# Patient Record
Sex: MALE | Race: WHITE | NOT HISPANIC OR LATINO | Employment: FULL TIME | URBAN - NONMETROPOLITAN AREA
[De-identification: names, ages, dates, MRNs, and addresses within clinical notes are randomized per-mention and may not be internally consistent; named-entity substitution may affect disease eponyms.]

---

## 2022-06-28 RX ORDER — CARVEDILOL 25 MG/1
TABLET ORAL
Qty: 180 TABLET | OUTPATIENT
Start: 2022-06-28

## 2022-06-28 RX ORDER — VALSARTAN 160 MG/1
TABLET ORAL
Qty: 90 TABLET | OUTPATIENT
Start: 2022-06-28

## 2022-07-25 RX ORDER — VALSARTAN 160 MG/1
TABLET ORAL
Qty: 30 TABLET | Refills: 0 | Status: SHIPPED | OUTPATIENT
Start: 2022-07-25 | End: 2022-09-02 | Stop reason: SDUPTHER

## 2022-09-02 ENCOUNTER — OFFICE VISIT (OUTPATIENT)
Dept: FAMILY MEDICINE CLINIC | Facility: CLINIC | Age: 67
End: 2022-09-02

## 2022-09-02 VITALS
HEIGHT: 68 IN | SYSTOLIC BLOOD PRESSURE: 118 MMHG | OXYGEN SATURATION: 97 % | WEIGHT: 184.8 LBS | DIASTOLIC BLOOD PRESSURE: 70 MMHG | BODY MASS INDEX: 28.01 KG/M2 | HEART RATE: 74 BPM

## 2022-09-02 DIAGNOSIS — I10 ESSENTIAL HYPERTENSION: Primary | ICD-10-CM

## 2022-09-02 PROBLEM — E29.1 MALE HYPOGONADISM: Status: ACTIVE | Noted: 2020-02-28

## 2022-09-02 PROBLEM — K21.9 GASTROESOPHAGEAL REFLUX DISEASE WITHOUT ESOPHAGITIS: Status: ACTIVE | Noted: 2020-02-14

## 2022-09-02 PROBLEM — N40.1 BENIGN PROSTATIC HYPERPLASIA WITH URINARY OBSTRUCTION: Status: ACTIVE | Noted: 2020-02-14

## 2022-09-02 PROBLEM — N40.1 LOWER URINARY TRACT SYMPTOMS DUE TO BENIGN PROSTATIC HYPERPLASIA: Status: ACTIVE | Noted: 2022-03-17

## 2022-09-02 PROBLEM — N13.8 BENIGN PROSTATIC HYPERPLASIA WITH URINARY OBSTRUCTION: Status: ACTIVE | Noted: 2020-02-14

## 2022-09-02 PROCEDURE — 99213 OFFICE O/P EST LOW 20 MIN: CPT | Performed by: PHYSICIAN ASSISTANT

## 2022-09-02 RX ORDER — CARVEDILOL 25 MG/1
TABLET ORAL EVERY 12 HOURS SCHEDULED
COMMUNITY
End: 2022-09-02 | Stop reason: SDUPTHER

## 2022-09-02 RX ORDER — CARVEDILOL 25 MG/1
25 TABLET ORAL EVERY 12 HOURS SCHEDULED
Qty: 180 TABLET | Refills: 1 | Status: SHIPPED | OUTPATIENT
Start: 2022-09-02 | End: 2023-02-23

## 2022-09-02 RX ORDER — VALSARTAN 160 MG/1
160 TABLET ORAL DAILY
Qty: 90 TABLET | Refills: 1 | Status: SHIPPED | OUTPATIENT
Start: 2022-09-02 | End: 2023-02-23

## 2022-09-08 PROCEDURE — 36415 COLL VENOUS BLD VENIPUNCTURE: CPT | Performed by: PHYSICIAN ASSISTANT

## 2022-09-09 LAB
ALBUMIN SERPL-MCNC: 4.9 G/DL (ref 3.8–4.8)
ALBUMIN/GLOB SERPL: 2.1 {RATIO} (ref 1.2–2.2)
ALP SERPL-CCNC: 56 IU/L (ref 44–121)
ALT SERPL-CCNC: 24 IU/L (ref 0–44)
AST SERPL-CCNC: 26 IU/L (ref 0–40)
BILIRUB SERPL-MCNC: 0.3 MG/DL (ref 0–1.2)
BUN SERPL-MCNC: 18 MG/DL (ref 8–27)
BUN/CREAT SERPL: 17 (ref 10–24)
CALCIUM SERPL-MCNC: 9.8 MG/DL (ref 8.6–10.2)
CHLORIDE SERPL-SCNC: 102 MMOL/L (ref 96–106)
CO2 SERPL-SCNC: 21 MMOL/L (ref 20–29)
CREAT SERPL-MCNC: 1.04 MG/DL (ref 0.76–1.27)
EGFRCR-CYS SERPLBLD CKD-EPI 2021: 78 ML/MIN/1.73
GLOBULIN SER CALC-MCNC: 2.3 G/DL (ref 1.5–4.5)
GLUCOSE SERPL-MCNC: 95 MG/DL (ref 65–99)
POTASSIUM SERPL-SCNC: 4.9 MMOL/L (ref 3.5–5.2)
PROT SERPL-MCNC: 7.2 G/DL (ref 6–8.5)
SODIUM SERPL-SCNC: 138 MMOL/L (ref 134–144)

## 2023-02-13 ENCOUNTER — TELEPHONE (OUTPATIENT)
Dept: FAMILY MEDICINE CLINIC | Facility: CLINIC | Age: 68
End: 2023-02-13

## 2023-02-23 RX ORDER — VALSARTAN 160 MG/1
160 TABLET ORAL DAILY
Qty: 90 TABLET | Refills: 1 | Status: SHIPPED | OUTPATIENT
Start: 2023-02-23 | End: 2023-02-28 | Stop reason: SDUPTHER

## 2023-02-23 RX ORDER — CARVEDILOL 25 MG/1
TABLET ORAL
Qty: 180 TABLET | Refills: 1 | Status: SHIPPED | OUTPATIENT
Start: 2023-02-23 | End: 2023-02-28 | Stop reason: SDUPTHER

## 2023-02-28 RX ORDER — VALSARTAN 160 MG/1
160 TABLET ORAL DAILY
Qty: 90 TABLET | Refills: 1 | Status: SHIPPED | OUTPATIENT
Start: 2023-02-28 | End: 2023-03-24 | Stop reason: SDUPTHER

## 2023-02-28 RX ORDER — CARVEDILOL 25 MG/1
25 TABLET ORAL EVERY 12 HOURS
Qty: 180 TABLET | Refills: 1 | Status: SHIPPED | OUTPATIENT
Start: 2023-02-28 | End: 2023-03-24 | Stop reason: SDUPTHER

## 2023-03-24 ENCOUNTER — OFFICE VISIT (OUTPATIENT)
Dept: FAMILY MEDICINE CLINIC | Facility: CLINIC | Age: 68
End: 2023-03-24
Payer: MEDICARE

## 2023-03-24 VITALS
BODY MASS INDEX: 28.79 KG/M2 | SYSTOLIC BLOOD PRESSURE: 132 MMHG | WEIGHT: 190 LBS | DIASTOLIC BLOOD PRESSURE: 90 MMHG | HEIGHT: 68 IN

## 2023-03-24 DIAGNOSIS — R05.3 CHRONIC COUGH: ICD-10-CM

## 2023-03-24 DIAGNOSIS — R05.3 CHRONIC COUGH: Primary | ICD-10-CM

## 2023-03-24 DIAGNOSIS — I10 ESSENTIAL HYPERTENSION: ICD-10-CM

## 2023-03-24 DIAGNOSIS — K21.9 GERD WITHOUT ESOPHAGITIS: ICD-10-CM

## 2023-03-24 DIAGNOSIS — R93.89 ABNORMAL FINDING ON CHEST XRAY: Primary | ICD-10-CM

## 2023-03-24 PROBLEM — E29.1 MALE HYPOGONADISM: Status: RESOLVED | Noted: 2020-02-28 | Resolved: 2023-03-24

## 2023-03-24 PROBLEM — N40.1 LOWER URINARY TRACT SYMPTOMS DUE TO BENIGN PROSTATIC HYPERPLASIA: Status: RESOLVED | Noted: 2022-03-17 | Resolved: 2023-03-24

## 2023-03-24 RX ORDER — VALSARTAN 160 MG/1
160 TABLET ORAL DAILY
Qty: 90 TABLET | Refills: 1 | Status: SHIPPED | OUTPATIENT
Start: 2023-03-24

## 2023-03-24 RX ORDER — CARVEDILOL 25 MG/1
25 TABLET ORAL EVERY 12 HOURS
Qty: 180 TABLET | Refills: 1 | Status: SHIPPED | OUTPATIENT
Start: 2023-03-24

## 2023-03-24 RX ORDER — PANTOPRAZOLE SODIUM 40 MG/1
40 TABLET, DELAYED RELEASE ORAL DAILY
Qty: 30 TABLET | Refills: 1 | Status: SHIPPED | OUTPATIENT
Start: 2023-03-24 | End: 2023-03-27

## 2023-03-27 RX ORDER — PANTOPRAZOLE SODIUM 40 MG/1
40 TABLET, DELAYED RELEASE ORAL DAILY
Qty: 90 TABLET | Refills: 0 | Status: SHIPPED | OUTPATIENT
Start: 2023-03-27

## 2023-03-28 ENCOUNTER — TELEPHONE (OUTPATIENT)
Dept: FAMILY MEDICINE CLINIC | Facility: CLINIC | Age: 68
End: 2023-03-28
Payer: MEDICARE

## 2023-03-29 ENCOUNTER — TELEPHONE (OUTPATIENT)
Dept: FAMILY MEDICINE CLINIC | Facility: CLINIC | Age: 68
End: 2023-03-29
Payer: MEDICARE

## 2023-04-07 ENCOUNTER — TELEPHONE (OUTPATIENT)
Dept: FAMILY MEDICINE CLINIC | Facility: CLINIC | Age: 68
End: 2023-04-07
Payer: MEDICARE

## 2023-04-20 ENCOUNTER — TELEPHONE (OUTPATIENT)
Dept: FAMILY MEDICINE CLINIC | Facility: CLINIC | Age: 68
End: 2023-04-20
Payer: MEDICARE

## 2023-04-20 DIAGNOSIS — R05.3 CHRONIC COUGH: ICD-10-CM

## 2023-04-20 DIAGNOSIS — K21.9 GERD WITHOUT ESOPHAGITIS: ICD-10-CM

## 2023-04-20 RX ORDER — PANTOPRAZOLE SODIUM 40 MG/1
40 TABLET, DELAYED RELEASE ORAL DAILY
Qty: 90 TABLET | Refills: 0 | Status: SHIPPED | OUTPATIENT
Start: 2023-04-20

## 2023-05-02 ENCOUNTER — OFFICE VISIT (OUTPATIENT)
Dept: FAMILY MEDICINE CLINIC | Facility: CLINIC | Age: 68
End: 2023-05-02
Payer: MEDICARE

## 2023-05-02 VITALS
DIASTOLIC BLOOD PRESSURE: 78 MMHG | HEIGHT: 68 IN | BODY MASS INDEX: 28.2 KG/M2 | SYSTOLIC BLOOD PRESSURE: 130 MMHG | WEIGHT: 186.1 LBS | OXYGEN SATURATION: 97 % | HEART RATE: 61 BPM

## 2023-05-02 DIAGNOSIS — Z12.11 SCREENING FOR COLON CANCER: ICD-10-CM

## 2023-05-02 DIAGNOSIS — J84.9 CHRONIC INTERSTITIAL LUNG DISEASE: ICD-10-CM

## 2023-05-02 DIAGNOSIS — R10.2 PELVIC PAIN IN MALE: ICD-10-CM

## 2023-05-02 DIAGNOSIS — Z00.00 GENERAL MEDICAL EXAM: Primary | ICD-10-CM

## 2023-05-02 DIAGNOSIS — Z23 NEED FOR PROPHYLACTIC VACCINATION AGAINST STREPTOCOCCUS PNEUMONIAE (PNEUMOCOCCUS): ICD-10-CM

## 2023-05-02 DIAGNOSIS — I10 ESSENTIAL HYPERTENSION: Chronic | ICD-10-CM

## 2023-05-02 DIAGNOSIS — Z12.5 PROSTATE CANCER SCREENING: ICD-10-CM

## 2023-05-02 DIAGNOSIS — N28.1 RENAL CYST: ICD-10-CM

## 2023-05-02 DIAGNOSIS — J43.9 PULMONARY EMPHYSEMA, UNSPECIFIED EMPHYSEMA TYPE: ICD-10-CM

## 2023-05-02 DIAGNOSIS — Z11.59 NEED FOR HEPATITIS C SCREENING TEST: ICD-10-CM

## 2023-05-02 DIAGNOSIS — R05.3 CHRONIC COUGH: ICD-10-CM

## 2023-05-02 PROBLEM — K21.9 GASTROESOPHAGEAL REFLUX DISEASE WITHOUT ESOPHAGITIS: Status: RESOLVED | Noted: 2020-02-14 | Resolved: 2023-05-02

## 2023-05-02 PROBLEM — K21.9 GERD WITHOUT ESOPHAGITIS: Status: ACTIVE | Noted: 2023-05-02

## 2023-05-02 PROBLEM — N13.8 BENIGN PROSTATIC HYPERPLASIA WITH URINARY OBSTRUCTION: Status: RESOLVED | Noted: 2020-02-14 | Resolved: 2023-05-02

## 2023-05-02 PROBLEM — K21.9 GERD WITHOUT ESOPHAGITIS: Status: RESOLVED | Noted: 2023-05-02 | Resolved: 2023-05-02

## 2023-05-02 PROBLEM — N40.1 BENIGN PROSTATIC HYPERPLASIA WITH URINARY OBSTRUCTION: Status: RESOLVED | Noted: 2020-02-14 | Resolved: 2023-05-02

## 2023-05-02 RX ORDER — CARVEDILOL 25 MG/1
25 TABLET ORAL EVERY 12 HOURS
Qty: 180 TABLET | Refills: 1 | Status: SHIPPED | OUTPATIENT
Start: 2023-05-02

## 2023-05-02 RX ORDER — VALSARTAN 160 MG/1
160 TABLET ORAL DAILY
Qty: 90 TABLET | Refills: 1 | Status: SHIPPED | OUTPATIENT
Start: 2023-05-02

## 2023-05-06 LAB
ALBUMIN SERPL-MCNC: 4.6 G/DL (ref 3.8–4.8)
ALBUMIN/GLOB SERPL: 1.7 {RATIO} (ref 1.2–2.2)
ALP SERPL-CCNC: 66 IU/L (ref 44–121)
ALT SERPL-CCNC: 21 IU/L (ref 0–44)
AST SERPL-CCNC: 24 IU/L (ref 0–40)
BASOPHILS # BLD AUTO: 0.1 X10E3/UL (ref 0–0.2)
BASOPHILS NFR BLD AUTO: 1 %
BILIRUB SERPL-MCNC: 0.3 MG/DL (ref 0–1.2)
BUN SERPL-MCNC: 17 MG/DL (ref 8–27)
BUN/CREAT SERPL: 15 (ref 10–24)
CALCIUM SERPL-MCNC: 9.2 MG/DL (ref 8.6–10.2)
CHLORIDE SERPL-SCNC: 102 MMOL/L (ref 96–106)
CHOLEST SERPL-MCNC: 188 MG/DL (ref 100–199)
CO2 SERPL-SCNC: 19 MMOL/L (ref 20–29)
CREAT SERPL-MCNC: 1.13 MG/DL (ref 0.76–1.27)
EGFRCR SERPLBLD CKD-EPI 2021: 70 ML/MIN/1.73
EOSINOPHIL # BLD AUTO: 0.6 X10E3/UL (ref 0–0.4)
EOSINOPHIL NFR BLD AUTO: 8 %
ERYTHROCYTE [DISTWIDTH] IN BLOOD BY AUTOMATED COUNT: 13 % (ref 11.6–15.4)
GLOBULIN SER CALC-MCNC: 2.7 G/DL (ref 1.5–4.5)
GLUCOSE SERPL-MCNC: 96 MG/DL (ref 70–99)
HCT VFR BLD AUTO: 43.7 % (ref 37.5–51)
HCV AB SERPL QL IA: NORMAL
HCV IGG SERPL QL IA: NON REACTIVE
HDLC SERPL-MCNC: 33 MG/DL
HGB BLD-MCNC: 14.6 G/DL (ref 13–17.7)
IMM GRANULOCYTES # BLD AUTO: 0 X10E3/UL (ref 0–0.1)
IMM GRANULOCYTES NFR BLD AUTO: 0 %
LDLC SERPL CALC-MCNC: 127 MG/DL (ref 0–99)
LYMPHOCYTES # BLD AUTO: 2 X10E3/UL (ref 0.7–3.1)
LYMPHOCYTES NFR BLD AUTO: 25 %
MCH RBC QN AUTO: 28.2 PG (ref 26.6–33)
MCHC RBC AUTO-ENTMCNC: 33.4 G/DL (ref 31.5–35.7)
MCV RBC AUTO: 85 FL (ref 79–97)
MONOCYTES # BLD AUTO: 0.5 X10E3/UL (ref 0.1–0.9)
MONOCYTES NFR BLD AUTO: 7 %
NEUTROPHILS # BLD AUTO: 4.7 X10E3/UL (ref 1.4–7)
NEUTROPHILS NFR BLD AUTO: 59 %
PLATELET # BLD AUTO: 287 X10E3/UL (ref 150–450)
POTASSIUM SERPL-SCNC: 4.4 MMOL/L (ref 3.5–5.2)
PROT SERPL-MCNC: 7.3 G/DL (ref 6–8.5)
PSA SERPL-MCNC: 1.2 NG/ML (ref 0–4)
RBC # BLD AUTO: 5.17 X10E6/UL (ref 4.14–5.8)
SODIUM SERPL-SCNC: 140 MMOL/L (ref 134–144)
T4 FREE SERPL-MCNC: 1.09 NG/DL (ref 0.82–1.77)
TRIGL SERPL-MCNC: 157 MG/DL (ref 0–149)
TSH SERPL DL<=0.005 MIU/L-ACNC: 2.86 UIU/ML (ref 0.45–4.5)
VLDLC SERPL CALC-MCNC: 28 MG/DL (ref 5–40)
WBC # BLD AUTO: 7.9 X10E3/UL (ref 3.4–10.8)

## 2023-05-12 ENCOUNTER — TELEPHONE (OUTPATIENT)
Dept: FAMILY MEDICINE CLINIC | Facility: CLINIC | Age: 68
End: 2023-05-12

## 2023-08-01 ENCOUNTER — HOSPITAL ENCOUNTER (OUTPATIENT)
Dept: CT IMAGING | Facility: HOSPITAL | Age: 68
Discharge: HOME | End: 2023-08-01
Admitting: INTERNAL MEDICINE
Payer: MEDICARE

## 2023-08-01 DIAGNOSIS — J84.9 CHRONIC INTERSTITIAL LUNG DISEASE: ICD-10-CM

## 2023-08-01 PROCEDURE — 71250 CT THORAX DX C-: CPT

## 2023-08-23 ENCOUNTER — OFFICE VISIT (OUTPATIENT)
Dept: PULMONOLOGY | Facility: CLINIC | Age: 68
End: 2023-08-23
Payer: MEDICARE

## 2023-08-23 VITALS
DIASTOLIC BLOOD PRESSURE: 84 MMHG | HEIGHT: 68 IN | TEMPERATURE: 97.7 F | WEIGHT: 175.7 LBS | SYSTOLIC BLOOD PRESSURE: 118 MMHG | OXYGEN SATURATION: 98 % | HEART RATE: 53 BPM | BODY MASS INDEX: 26.63 KG/M2

## 2023-08-23 DIAGNOSIS — J84.9 CHRONIC INTERSTITIAL LUNG DISEASE: Primary | ICD-10-CM

## 2023-08-23 DIAGNOSIS — R05.3 CHRONIC COUGH: ICD-10-CM

## 2023-08-23 PROCEDURE — 99214 OFFICE O/P EST MOD 30 MIN: CPT | Performed by: NURSE PRACTITIONER

## 2023-08-23 PROCEDURE — 1159F MED LIST DOCD IN RCRD: CPT | Performed by: NURSE PRACTITIONER

## 2023-08-23 PROCEDURE — 1160F RVW MEDS BY RX/DR IN RCRD: CPT | Performed by: NURSE PRACTITIONER

## 2023-08-23 PROCEDURE — 3079F DIAST BP 80-89 MM HG: CPT | Performed by: NURSE PRACTITIONER

## 2023-08-23 PROCEDURE — 3074F SYST BP LT 130 MM HG: CPT | Performed by: NURSE PRACTITIONER

## 2023-08-24 DIAGNOSIS — I10 ESSENTIAL HYPERTENSION: Chronic | ICD-10-CM

## 2023-08-24 RX ORDER — CARVEDILOL 25 MG/1
25 TABLET ORAL EVERY 12 HOURS
Qty: 180 TABLET | Refills: 1 | Status: SHIPPED | OUTPATIENT
Start: 2023-08-24

## 2023-08-24 RX ORDER — VALSARTAN 160 MG/1
160 TABLET ORAL DAILY
Qty: 90 TABLET | Refills: 1 | Status: SHIPPED | OUTPATIENT
Start: 2023-08-24

## 2023-08-28 ENCOUNTER — TELEPHONE (OUTPATIENT)
Dept: PULMONOLOGY | Facility: CLINIC | Age: 68
End: 2023-08-28
Payer: MEDICARE

## 2023-08-28 DIAGNOSIS — J84.9 CHRONIC INTERSTITIAL LUNG DISEASE: Primary | ICD-10-CM

## 2023-09-29 ENCOUNTER — TELEPHONE (OUTPATIENT)
Dept: PULMONOLOGY | Facility: CLINIC | Age: 68
End: 2023-09-29
Payer: MEDICARE

## 2023-10-10 ENCOUNTER — TELEPHONE (OUTPATIENT)
Dept: PULMONOLOGY | Facility: CLINIC | Age: 68
End: 2023-10-10
Payer: MEDICARE

## 2023-10-17 ENCOUNTER — OFFICE VISIT (OUTPATIENT)
Dept: FAMILY MEDICINE CLINIC | Facility: CLINIC | Age: 68
End: 2023-10-17
Payer: MEDICARE

## 2023-10-17 VITALS
TEMPERATURE: 97.1 F | DIASTOLIC BLOOD PRESSURE: 80 MMHG | HEART RATE: 76 BPM | SYSTOLIC BLOOD PRESSURE: 136 MMHG | BODY MASS INDEX: 28.34 KG/M2 | OXYGEN SATURATION: 95 % | WEIGHT: 187 LBS | HEIGHT: 68 IN

## 2023-10-17 DIAGNOSIS — R05.3 CHRONIC COUGH: ICD-10-CM

## 2023-10-17 DIAGNOSIS — J84.9 CHRONIC INTERSTITIAL LUNG DISEASE: ICD-10-CM

## 2023-10-17 DIAGNOSIS — E66.3 OVERWEIGHT (BMI 25.0-29.9): ICD-10-CM

## 2023-10-17 DIAGNOSIS — M25.511 CHRONIC RIGHT SHOULDER PAIN: ICD-10-CM

## 2023-10-17 DIAGNOSIS — G89.29 CHRONIC RIGHT SHOULDER PAIN: ICD-10-CM

## 2023-10-17 DIAGNOSIS — I10 ESSENTIAL HYPERTENSION: Primary | Chronic | ICD-10-CM

## 2023-10-17 DIAGNOSIS — N28.1 RENAL CYST: ICD-10-CM

## 2023-10-17 PROBLEM — Z23 NEED FOR PROPHYLACTIC VACCINATION AGAINST STREPTOCOCCUS PNEUMONIAE (PNEUMOCOCCUS): Status: RESOLVED | Noted: 2023-05-02 | Resolved: 2023-10-17

## 2023-10-17 PROBLEM — R10.2 PELVIC PAIN IN MALE: Status: RESOLVED | Noted: 2023-05-02 | Resolved: 2023-10-17

## 2023-10-17 PROBLEM — Z11.59 NEED FOR HEPATITIS C SCREENING TEST: Status: RESOLVED | Noted: 2023-05-02 | Resolved: 2023-10-17

## 2023-10-17 RX ORDER — VALSARTAN 160 MG/1
160 TABLET ORAL DAILY
Qty: 90 TABLET | Refills: 1 | Status: SHIPPED | OUTPATIENT
Start: 2023-10-17

## 2023-10-17 RX ORDER — CARVEDILOL 25 MG/1
25 TABLET ORAL EVERY 12 HOURS
Qty: 180 TABLET | Refills: 1 | Status: SHIPPED | OUTPATIENT
Start: 2023-10-17

## 2023-10-20 ENCOUNTER — TELEPHONE (OUTPATIENT)
Dept: FAMILY MEDICINE CLINIC | Facility: CLINIC | Age: 68
End: 2023-10-20
Payer: MEDICARE

## 2023-11-07 ENCOUNTER — TELEPHONE (OUTPATIENT)
Dept: FAMILY MEDICINE CLINIC | Facility: CLINIC | Age: 68
End: 2023-11-07
Payer: MEDICARE

## 2023-11-07 DIAGNOSIS — E60 ZINC DEFICIENCY: Primary | ICD-10-CM

## 2023-11-09 ENCOUNTER — LAB (OUTPATIENT)
Dept: FAMILY MEDICINE CLINIC | Facility: CLINIC | Age: 68
End: 2023-11-09
Payer: MEDICARE

## 2023-11-09 DIAGNOSIS — J84.9 CHRONIC INTERSTITIAL LUNG DISEASE: ICD-10-CM

## 2023-11-09 DIAGNOSIS — E60 ZINC DEFICIENCY: ICD-10-CM

## 2023-11-09 PROCEDURE — 36415 COLL VENOUS BLD VENIPUNCTURE: CPT | Performed by: NURSE PRACTITIONER

## 2023-11-10 LAB
ALBUMIN SERPL-MCNC: 4.6 G/DL (ref 3.9–4.9)
ALBUMIN/GLOB SERPL: 1.7 {RATIO} (ref 1.2–2.2)
ALP SERPL-CCNC: 61 IU/L (ref 44–121)
ALT SERPL-CCNC: 37 IU/L (ref 0–44)
AST SERPL-CCNC: 28 IU/L (ref 0–40)
BILIRUB SERPL-MCNC: 0.4 MG/DL (ref 0–1.2)
BUN SERPL-MCNC: 25 MG/DL (ref 8–27)
BUN/CREAT SERPL: 24 (ref 10–24)
CALCIUM SERPL-MCNC: 9.6 MG/DL (ref 8.6–10.2)
CHLORIDE SERPL-SCNC: 104 MMOL/L (ref 96–106)
CO2 SERPL-SCNC: 21 MMOL/L (ref 20–29)
CREAT SERPL-MCNC: 1.06 MG/DL (ref 0.76–1.27)
EGFRCR SERPLBLD CKD-EPI 2021: 76 ML/MIN/1.73
GLOBULIN SER CALC-MCNC: 2.7 G/DL (ref 1.5–4.5)
GLUCOSE SERPL-MCNC: 98 MG/DL (ref 70–99)
POTASSIUM SERPL-SCNC: 4.9 MMOL/L (ref 3.5–5.2)
PROT SERPL-MCNC: 7.3 G/DL (ref 6–8.5)
SODIUM SERPL-SCNC: 139 MMOL/L (ref 134–144)

## 2023-11-13 LAB — ZINC BLD-MCNC: 709 UG/DL (ref 440–860)

## 2023-11-21 ENCOUNTER — TELEPHONE (OUTPATIENT)
Dept: PULMONOLOGY | Facility: CLINIC | Age: 68
End: 2023-11-21
Payer: MEDICARE

## 2024-02-02 ENCOUNTER — TELEPHONE (OUTPATIENT)
Dept: FAMILY MEDICINE CLINIC | Facility: CLINIC | Age: 69
End: 2024-02-02
Payer: MEDICARE

## 2024-02-05 DIAGNOSIS — I10 ESSENTIAL HYPERTENSION: Chronic | ICD-10-CM

## 2024-02-05 RX ORDER — VALSARTAN 160 MG/1
160 TABLET ORAL DAILY
Qty: 90 TABLET | Refills: 1 | Status: SHIPPED | OUTPATIENT
Start: 2024-02-05

## 2024-02-05 RX ORDER — CARVEDILOL 25 MG/1
25 TABLET ORAL EVERY 12 HOURS
Qty: 180 TABLET | Refills: 1 | Status: SHIPPED | OUTPATIENT
Start: 2024-02-05

## 2024-02-19 ENCOUNTER — TELEPHONE (OUTPATIENT)
Dept: FAMILY MEDICINE CLINIC | Facility: CLINIC | Age: 69
End: 2024-02-19
Payer: MEDICARE

## 2024-02-19 DIAGNOSIS — I10 ESSENTIAL HYPERTENSION: Chronic | ICD-10-CM

## 2024-02-19 DIAGNOSIS — J84.9 CHRONIC INTERSTITIAL LUNG DISEASE: Primary | ICD-10-CM

## 2024-02-19 DIAGNOSIS — R05.3 CHRONIC COUGH: ICD-10-CM

## 2024-02-19 RX ORDER — VALSARTAN 160 MG/1
160 TABLET ORAL DAILY
Qty: 90 TABLET | Refills: 0 | Status: SHIPPED | OUTPATIENT
Start: 2024-02-19 | End: 2024-02-26 | Stop reason: SDUPTHER

## 2024-02-26 DIAGNOSIS — I10 ESSENTIAL HYPERTENSION: Chronic | ICD-10-CM

## 2024-02-26 RX ORDER — VALSARTAN 160 MG/1
160 TABLET ORAL DAILY
Qty: 90 TABLET | Refills: 0 | Status: SHIPPED | OUTPATIENT
Start: 2024-02-26

## 2024-02-26 RX ORDER — CARVEDILOL 25 MG/1
25 TABLET ORAL EVERY 12 HOURS
Qty: 180 TABLET | Refills: 0 | Status: SHIPPED | OUTPATIENT
Start: 2024-02-26

## 2024-05-10 ENCOUNTER — OFFICE VISIT (OUTPATIENT)
Dept: FAMILY MEDICINE CLINIC | Facility: CLINIC | Age: 69
End: 2024-05-10
Payer: MEDICARE

## 2024-05-10 VITALS
BODY MASS INDEX: 28.69 KG/M2 | WEIGHT: 189.3 LBS | HEIGHT: 68 IN | SYSTOLIC BLOOD PRESSURE: 154 MMHG | HEART RATE: 77 BPM | OXYGEN SATURATION: 96 % | DIASTOLIC BLOOD PRESSURE: 88 MMHG

## 2024-05-10 DIAGNOSIS — R05.3 CHRONIC COUGH: ICD-10-CM

## 2024-05-10 DIAGNOSIS — E66.3 OVERWEIGHT (BMI 25.0-29.9): ICD-10-CM

## 2024-05-10 DIAGNOSIS — J43.9 PULMONARY EMPHYSEMA, UNSPECIFIED EMPHYSEMA TYPE: ICD-10-CM

## 2024-05-10 DIAGNOSIS — Z00.00 GENERAL MEDICAL EXAM: Primary | ICD-10-CM

## 2024-05-10 DIAGNOSIS — J84.9 CHRONIC INTERSTITIAL LUNG DISEASE: ICD-10-CM

## 2024-05-10 DIAGNOSIS — I10 ESSENTIAL HYPERTENSION: Chronic | ICD-10-CM

## 2024-05-10 DIAGNOSIS — Z12.11 SCREENING FOR COLON CANCER: ICD-10-CM

## 2024-05-10 DIAGNOSIS — N28.1 RENAL CYST: ICD-10-CM

## 2024-05-10 DIAGNOSIS — R73.9 HYPERGLYCEMIA: ICD-10-CM

## 2024-05-10 DIAGNOSIS — M25.511 CHRONIC RIGHT SHOULDER PAIN: ICD-10-CM

## 2024-05-10 DIAGNOSIS — G89.29 CHRONIC RIGHT SHOULDER PAIN: ICD-10-CM

## 2024-05-10 DIAGNOSIS — Z12.5 PROSTATE CANCER SCREENING: ICD-10-CM

## 2024-05-10 RX ORDER — VALSARTAN 320 MG/1
320 TABLET ORAL DAILY
Qty: 90 TABLET | Refills: 2 | Status: SHIPPED | OUTPATIENT
Start: 2024-05-10

## 2024-05-10 RX ORDER — CARVEDILOL 25 MG/1
25 TABLET ORAL EVERY 12 HOURS
Qty: 180 TABLET | Refills: 2 | Status: SHIPPED | OUTPATIENT
Start: 2024-05-10

## 2024-05-11 LAB
ALBUMIN SERPL-MCNC: 4.5 G/DL (ref 3.9–4.9)
ALBUMIN/GLOB SERPL: 1.7 {RATIO} (ref 1.2–2.2)
ALP SERPL-CCNC: 55 IU/L (ref 44–121)
ALT SERPL-CCNC: 27 IU/L (ref 0–44)
AST SERPL-CCNC: 28 IU/L (ref 0–40)
BASOPHILS # BLD AUTO: 0.1 X10E3/UL (ref 0–0.2)
BASOPHILS NFR BLD AUTO: 1 %
BILIRUB SERPL-MCNC: 0.3 MG/DL (ref 0–1.2)
BUN SERPL-MCNC: 19 MG/DL (ref 8–27)
BUN/CREAT SERPL: 18 (ref 10–24)
CALCIUM SERPL-MCNC: 9.2 MG/DL (ref 8.6–10.2)
CHLORIDE SERPL-SCNC: 101 MMOL/L (ref 96–106)
CHOLEST SERPL-MCNC: 197 MG/DL (ref 100–199)
CO2 SERPL-SCNC: 22 MMOL/L (ref 20–29)
CREAT SERPL-MCNC: 1.03 MG/DL (ref 0.76–1.27)
EGFRCR SERPLBLD CKD-EPI 2021: 78 ML/MIN/1.73
EOSINOPHIL # BLD AUTO: 0.4 X10E3/UL (ref 0–0.4)
EOSINOPHIL NFR BLD AUTO: 5 %
ERYTHROCYTE [DISTWIDTH] IN BLOOD BY AUTOMATED COUNT: 13.1 % (ref 11.6–15.4)
GLOBULIN SER CALC-MCNC: 2.7 G/DL (ref 1.5–4.5)
GLUCOSE SERPL-MCNC: 104 MG/DL (ref 70–99)
HBA1C MFR BLD: 5.7 % (ref 4.8–5.6)
HCT VFR BLD AUTO: 44.4 % (ref 37.5–51)
HDLC SERPL-MCNC: 29 MG/DL
HGB BLD-MCNC: 14.4 G/DL (ref 13–17.7)
IMM GRANULOCYTES # BLD AUTO: 0 X10E3/UL (ref 0–0.1)
IMM GRANULOCYTES NFR BLD AUTO: 0 %
LDLC SERPL CALC-MCNC: 113 MG/DL (ref 0–99)
LYMPHOCYTES # BLD AUTO: 2.2 X10E3/UL (ref 0.7–3.1)
LYMPHOCYTES NFR BLD AUTO: 27 %
MCH RBC QN AUTO: 29.1 PG (ref 26.6–33)
MCHC RBC AUTO-ENTMCNC: 32.4 G/DL (ref 31.5–35.7)
MCV RBC AUTO: 90 FL (ref 79–97)
MONOCYTES # BLD AUTO: 0.6 X10E3/UL (ref 0.1–0.9)
MONOCYTES NFR BLD AUTO: 7 %
NEUTROPHILS # BLD AUTO: 4.9 X10E3/UL (ref 1.4–7)
NEUTROPHILS NFR BLD AUTO: 60 %
PLATELET # BLD AUTO: 285 X10E3/UL (ref 150–450)
POTASSIUM SERPL-SCNC: 4.7 MMOL/L (ref 3.5–5.2)
PROT SERPL-MCNC: 7.2 G/DL (ref 6–8.5)
PSA SERPL-MCNC: 1.2 NG/ML (ref 0–4)
RBC # BLD AUTO: 4.94 X10E6/UL (ref 4.14–5.8)
SODIUM SERPL-SCNC: 138 MMOL/L (ref 134–144)
T4 FREE SERPL-MCNC: 0.99 NG/DL (ref 0.82–1.77)
TRIGL SERPL-MCNC: 316 MG/DL (ref 0–149)
TSH SERPL DL<=0.005 MIU/L-ACNC: 2.7 UIU/ML (ref 0.45–4.5)
VLDLC SERPL CALC-MCNC: 55 MG/DL (ref 5–40)
WBC # BLD AUTO: 8.2 X10E3/UL (ref 3.4–10.8)

## 2024-11-12 ENCOUNTER — OFFICE VISIT (OUTPATIENT)
Dept: FAMILY MEDICINE CLINIC | Facility: CLINIC | Age: 69
End: 2024-11-12
Payer: MEDICARE

## 2024-11-12 VITALS
HEART RATE: 72 BPM | HEIGHT: 68 IN | SYSTOLIC BLOOD PRESSURE: 142 MMHG | BODY MASS INDEX: 28.19 KG/M2 | DIASTOLIC BLOOD PRESSURE: 84 MMHG | OXYGEN SATURATION: 95 % | WEIGHT: 186 LBS

## 2024-11-12 DIAGNOSIS — I10 HYPERTENSION, ESSENTIAL: Primary | ICD-10-CM

## 2024-11-12 DIAGNOSIS — J84.9 CHRONIC INTERSTITIAL LUNG DISEASE: ICD-10-CM

## 2024-11-12 DIAGNOSIS — E66.3 OVERWEIGHT (BMI 25.0-29.9): ICD-10-CM

## 2024-11-12 DIAGNOSIS — E78.2 HYPERLIPIDEMIA, MIXED: ICD-10-CM

## 2024-11-12 DIAGNOSIS — R73.9 HYPERGLYCEMIA: ICD-10-CM

## 2024-11-12 PROBLEM — G89.29 CHRONIC RIGHT SHOULDER PAIN: Status: RESOLVED | Noted: 2023-10-17 | Resolved: 2024-11-12

## 2024-11-12 PROBLEM — R05.3 CHRONIC COUGH: Status: RESOLVED | Noted: 2023-03-24 | Resolved: 2024-11-12

## 2024-11-12 PROBLEM — M25.511 CHRONIC RIGHT SHOULDER PAIN: Status: RESOLVED | Noted: 2023-10-17 | Resolved: 2024-11-12

## 2024-11-12 PROBLEM — J43.9 PULMONARY EMPHYSEMA: Status: RESOLVED | Noted: 2023-05-02 | Resolved: 2024-11-12

## 2024-11-12 PROCEDURE — 3079F DIAST BP 80-89 MM HG: CPT | Performed by: FAMILY MEDICINE

## 2024-11-12 PROCEDURE — 99214 OFFICE O/P EST MOD 30 MIN: CPT | Performed by: FAMILY MEDICINE

## 2024-11-12 PROCEDURE — 1159F MED LIST DOCD IN RCRD: CPT | Performed by: FAMILY MEDICINE

## 2024-11-12 PROCEDURE — 1160F RVW MEDS BY RX/DR IN RCRD: CPT | Performed by: FAMILY MEDICINE

## 2024-11-12 PROCEDURE — G2211 COMPLEX E/M VISIT ADD ON: HCPCS | Performed by: FAMILY MEDICINE

## 2024-11-12 PROCEDURE — 1126F AMNT PAIN NOTED NONE PRSNT: CPT | Performed by: FAMILY MEDICINE

## 2024-11-12 PROCEDURE — 3077F SYST BP >= 140 MM HG: CPT | Performed by: FAMILY MEDICINE

## 2024-11-12 RX ORDER — VALSARTAN 320 MG/1
320 TABLET ORAL DAILY
Qty: 90 TABLET | Refills: 2 | Status: SHIPPED | OUTPATIENT
Start: 2024-11-12

## 2024-11-12 RX ORDER — CARVEDILOL 25 MG/1
25 TABLET ORAL EVERY 12 HOURS
Qty: 180 TABLET | Refills: 2 | Status: SHIPPED | OUTPATIENT
Start: 2024-11-12

## 2024-11-12 NOTE — ASSESSMENT & PLAN NOTE
Discussed blood pressure elevation today and he has been under more stress with the election.  We did discuss adding norvasc or hctz and he would like to check home bp readings and let us know if staying over 140/90 for med adjustment.  It did improve to 142/84 at the end of his visit today.

## 2024-11-12 NOTE — ASSESSMENT & PLAN NOTE
Patient's (Body mass index is 28.28 kg/m².) indicates that they are overweight with health conditions that include hypertension . Weight is unchanged. BMI is is above average; BMI management plan is completed. We discussed portion control and increasing exercise.

## 2024-11-12 NOTE — ASSESSMENT & PLAN NOTE
Discussed A1c and prediabetes    Working on diet/exercise    Will check A1c with fasting labs this week

## 2024-11-12 NOTE — PROGRESS NOTES
"Chief Complaint  ILD/pulmonary fibrosis, HTN, chronic right shoulder pain    Subjective    History of Present Illness:  Christian Newman is a 69 y.o. male who presents today for follow-up regarding HTN, ILD/Pulmonary fibrosis.    Doing well since his last visit in May for checkup/AWV together.     His wife is off hospice but still requiring constant care given her disabilities after her brain bleed.      He was diagnosed with pulmonary fibrosis in 2023 and started on Ofev.  He had problems with side effects and has discontinued Ofev.  He does have follow-up planned with pulmonology.    Discussed blood pressure elevation today and he has been under more stress with the election.  We did discuss adding norvasc or hctz and he would like to check home bp readings and let us know if staying over 140/90 for med adjustment.  It did improve to 142/84 at the end of his visit today.    Past CT imaging workup for his pulmonary fibrosis diagnosis did reveal a likely right renal cyst that was felt to be hemorrhagic.  We did discuss a follow-up CT scan with IV contrast and he declines at this time.  No hematuria.  He does understand option for further work-up with more CT imaging to further characterize this right renal mass in the future if interested.    Objective   Vital Signs:   /84   Pulse 72   Ht 172.7 cm (68\")   Wt 84.4 kg (186 lb)   SpO2 95%   BMI 28.28 kg/m²     Review of Systems   Constitutional:  Negative for appetite change, chills and fever.   HENT:  Negative for hearing loss.    Eyes:  Negative for blurred vision.   Respiratory:  Negative for chest tightness.    Cardiovascular:  Negative for chest pain.   Gastrointestinal:  Negative for abdominal pain.   Musculoskeletal:  Negative for gait problem.   Skin:  Negative for rash.   Psychiatric/Behavioral:  Negative for depressed mood.        Past History:  Medical History: has a past medical history of Hypertension.   Surgical History: has a past surgical " history that includes Shoulder arthrotomy (Left).   Family History: family history includes Hypertension in his mother; Tuberculosis in his father.   Social History: reports that he quit smoking about 43 years ago. His smoking use included cigarettes. He started smoking about 60 years ago. He has a 25.5 pack-year smoking history. He has never used smokeless tobacco. He reports that he does not currently use alcohol. He reports that he does not use drugs.      Current Outpatient Medications:     carvedilol (COREG) 25 MG tablet, Take 1 tablet by mouth Every 12 (Twelve) Hours., Disp: 180 tablet, Rfl: 2    valsartan (DIOVAN) 320 MG tablet, Take 1 tablet by mouth Daily. for blood pressure., Disp: 90 tablet, Rfl: 2    Allergies: Latex    Physical Exam  Constitutional:       Appearance: Normal appearance.   HENT:      Head: Normocephalic.      Right Ear: External ear normal.      Left Ear: External ear normal.      Nose: Nose normal.   Eyes:      Pupils: Pupils are equal, round, and reactive to light.   Cardiovascular:      Rate and Rhythm: Normal rate and regular rhythm.      Heart sounds: Normal heart sounds.   Pulmonary:      Effort: Pulmonary effort is normal.      Comments: Coarse BS bilat c/w advanced ILD diagnosis.   Musculoskeletal:         General: Normal range of motion.      Cervical back: Normal range of motion.   Skin:     General: Skin is warm and dry.   Neurological:      General: No focal deficit present.      Mental Status: He is alert.   Psychiatric:         Mood and Affect: Mood normal.         Behavior: Behavior normal.         Thought Content: Thought content normal.          Result Review                   Assessment and Plan  Diagnoses and all orders for this visit:    1. Hypertension, essential (Primary)  Assessment & Plan:  Discussed blood pressure elevation today and he has been under more stress with the election.  We did discuss adding norvasc or hctz and he would like to check home bp readings  and let us know if staying over 140/90 for med adjustment.  It did improve to 142/84 at the end of his visit today.    Orders:  -     Comprehensive Metabolic Panel; Future  -     Lipid Panel; Future  -     valsartan (DIOVAN) 320 MG tablet; Take 1 tablet by mouth Daily. for blood pressure.  Dispense: 90 tablet; Refill: 2  -     carvedilol (COREG) 25 MG tablet; Take 1 tablet by mouth Every 12 (Twelve) Hours.  Dispense: 180 tablet; Refill: 2    2. Hyperglycemia  Assessment & Plan:  Discussed A1c and prediabetes    Working on diet/exercise    Will check A1c with fasting labs this week     Orders:  -     Hemoglobin A1c; Future    3. Hyperlipidemia, mixed  Assessment & Plan:  Lipid elevation reviewed    Working on diet/exercise    Will return for check on fasting labs     Orders:  -     Comprehensive Metabolic Panel; Future  -     Lipid Panel; Future    4. Chronic interstitial lung disease  Assessment & Plan:  Follow-up ongoing with pulmonology.    Off Ofev given side-effects       5. Overweight (BMI 25.0-29.9)  Assessment & Plan:  Patient's (Body mass index is 28.28 kg/m².) indicates that they are overweight with health conditions that include hypertension . Weight is unchanged. BMI is is above average; BMI management plan is completed. We discussed portion control and increasing exercise.                      Follow Up  Return in about 6 months (around 5/12/2025) for Medicare Wellness.    Juve Diaz MD   no

## 2024-11-15 ENCOUNTER — LAB (OUTPATIENT)
Dept: FAMILY MEDICINE CLINIC | Facility: CLINIC | Age: 69
End: 2024-11-15
Payer: MEDICARE

## 2024-11-15 DIAGNOSIS — E78.2 HYPERLIPIDEMIA, MIXED: ICD-10-CM

## 2024-11-15 DIAGNOSIS — R73.9 HYPERGLYCEMIA: ICD-10-CM

## 2024-11-15 DIAGNOSIS — I10 HYPERTENSION, ESSENTIAL: ICD-10-CM

## 2024-11-16 LAB
ALBUMIN SERPL-MCNC: 4.4 G/DL (ref 3.9–4.9)
ALP SERPL-CCNC: 42 IU/L (ref 44–121)
ALT SERPL-CCNC: 20 IU/L (ref 0–44)
AST SERPL-CCNC: 22 IU/L (ref 0–40)
BILIRUB SERPL-MCNC: 0.4 MG/DL (ref 0–1.2)
BUN SERPL-MCNC: 20 MG/DL (ref 8–27)
BUN/CREAT SERPL: 20 (ref 10–24)
CALCIUM SERPL-MCNC: 9.8 MG/DL (ref 8.6–10.2)
CHLORIDE SERPL-SCNC: 105 MMOL/L (ref 96–106)
CHOLEST SERPL-MCNC: 201 MG/DL (ref 100–199)
CO2 SERPL-SCNC: 22 MMOL/L (ref 20–29)
CREAT SERPL-MCNC: 0.99 MG/DL (ref 0.76–1.27)
EGFRCR SERPLBLD CKD-EPI 2021: 82 ML/MIN/1.73
GLOBULIN SER CALC-MCNC: 2.8 G/DL (ref 1.5–4.5)
GLUCOSE SERPL-MCNC: 105 MG/DL (ref 70–99)
HBA1C MFR BLD: 5.8 % (ref 4.8–5.6)
HDLC SERPL-MCNC: 29 MG/DL
LDLC SERPL CALC-MCNC: 142 MG/DL (ref 0–99)
POTASSIUM SERPL-SCNC: 4.7 MMOL/L (ref 3.5–5.2)
PROT SERPL-MCNC: 7.2 G/DL (ref 6–8.5)
SODIUM SERPL-SCNC: 140 MMOL/L (ref 134–144)
TRIGL SERPL-MCNC: 167 MG/DL (ref 0–149)
VLDLC SERPL CALC-MCNC: 30 MG/DL (ref 5–40)

## 2024-11-18 ENCOUNTER — TELEPHONE (OUTPATIENT)
Dept: FAMILY MEDICINE CLINIC | Facility: CLINIC | Age: 69
End: 2024-11-18
Payer: MEDICARE

## 2024-11-18 NOTE — PROGRESS NOTES
THE MESSAGE BELOW IS ABLE TO BE GIVEN BY THE HUB.  THE HUB MAY SCHEDULE A FOLLOW-UP VISIT FOR THE PATIENT IF INDICATED IN THE MESSAGE BELOW...    Please call patient with recent lab work results:    His recent A1c returned stable at 5.8 in the prediabetes range.  No medications are needed but please have him work on low sugar and low carbohydrate diet with exercise to help prevent the progression to diabetes.    His lipid panel did return with elevation in his LDL cholesterol compared to past labs.  Please have him work harder with diet and exercise efforts and we will continue to monitor this with future lab work and if it is not improving or worsening in the future we can start a low-dose cholesterol medication.    His comprehensive metabolic panel returned with normal kidney function, good electrolytes, good liver enzymes, and mild blood sugar elevation in the prediabetes range    Please let me know if he has any questions after reviewing his labs

## 2024-11-18 NOTE — TELEPHONE ENCOUNTER
HUB TO RELAY    Please call patient with recent lab work results:     His recent A1c returned stable at 5.8 in the prediabetes range.  No medications are needed but please have him work on low sugar and low carbohydrate diet with exercise to help prevent the progression to diabetes.     His lipid panel did return with elevation in his LDL cholesterol compared to past labs.  Please have him work harder with diet and exercise efforts and we will continue to monitor this with future lab work and if it is not improving or worsening in the future we can start a low-dose cholesterol medication.     His comprehensive metabolic panel returned with normal kidney function, good electrolytes, good liver enzymes, and mild blood sugar elevation in the prediabetes range     Please let me know if he has any questions after reviewing his labs

## 2024-11-19 NOTE — TELEPHONE ENCOUNTER
Name: Ross Newmanurman ORLY      Relationship: Self      Best Callback Number:   Telephone Information:   Mobile [phone]          HUB PROVIDED THE RELAY MESSAGE FROM THE OFFICE      PATIENT: VOICED UNDERSTANDING AND HAS NO FURTHER QUESTIONS AT THIS TIME    ADDITIONAL INFORMATION:

## 2025-01-31 ENCOUNTER — OFFICE VISIT (OUTPATIENT)
Dept: FAMILY MEDICINE CLINIC | Facility: CLINIC | Age: 70
End: 2025-01-31
Payer: MEDICARE

## 2025-01-31 VITALS
SYSTOLIC BLOOD PRESSURE: 120 MMHG | WEIGHT: 183.2 LBS | OXYGEN SATURATION: 98 % | BODY MASS INDEX: 27.77 KG/M2 | HEART RATE: 96 BPM | HEIGHT: 68 IN | DIASTOLIC BLOOD PRESSURE: 86 MMHG

## 2025-01-31 DIAGNOSIS — M79.642 LEFT HAND PAIN: Primary | ICD-10-CM

## 2025-01-31 PROCEDURE — 1126F AMNT PAIN NOTED NONE PRSNT: CPT | Performed by: PHYSICIAN ASSISTANT

## 2025-01-31 PROCEDURE — 3074F SYST BP LT 130 MM HG: CPT | Performed by: PHYSICIAN ASSISTANT

## 2025-01-31 PROCEDURE — 3079F DIAST BP 80-89 MM HG: CPT | Performed by: PHYSICIAN ASSISTANT

## 2025-01-31 PROCEDURE — 99213 OFFICE O/P EST LOW 20 MIN: CPT | Performed by: PHYSICIAN ASSISTANT

## 2025-01-31 RX ORDER — MELOXICAM 7.5 MG/1
7.5 TABLET ORAL DAILY
Qty: 30 TABLET | Refills: 0 | Status: SHIPPED | OUTPATIENT
Start: 2025-01-31

## 2025-02-05 ENCOUNTER — TELEPHONE (OUTPATIENT)
Dept: FAMILY MEDICINE CLINIC | Facility: CLINIC | Age: 70
End: 2025-02-05
Payer: MEDICARE

## 2025-02-09 NOTE — PROGRESS NOTES
"Chief Complaint  Hand Injury (Pt states he hurt his left hand 2 days ago while moving a trailer. Pt states it is a level 5 pain score.)    Subjective        Christian Newman presents to [facility]  History of Present Illness    History of Present Illness  The patient is a 69-year-old male presenting with left middle finger pain.    He reports an injury while loading a trailer, pain escalated to 5/10 that night, now 2/10. He has difficulty bending the finger, which is swollen. He has not used OTC medications. 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 He recalls a previous laceration to the same finger requiring stitches but no fractures.          Objective   Vital Signs:  /86 (BP Location: Right arm, Patient Position: Sitting, Cuff Size: Adult)   Pulse 96   Ht 172.7 cm (68\")   Wt 83.1 kg (183 lb 3.2 oz)   SpO2 98%   BMI 27.86 kg/m²   Estimated body mass index is 27.86 kg/m² as calculated from the following:    Height as of this encounter: 172.7 cm (68\").    Weight as of this encounter: 83.1 kg (183 lb 3.2 oz).            Physical Exam  Vitals and nursing note reviewed.   Constitutional:       General: He is not in acute distress.     Appearance: Normal appearance.   HENT:      Head: Normocephalic.      Right Ear: Hearing normal.      Left Ear: Hearing normal.   Eyes:      Pupils: Pupils are equal, round, and reactive to light.   Cardiovascular:      Rate and Rhythm: Normal rate and regular rhythm.   Pulmonary:      Effort: Pulmonary effort is normal. No respiratory distress.   Musculoskeletal:         General: Tenderness present.   Skin:     General: Skin is warm and dry.   Neurological:      Mental Status: He is alert.   Psychiatric:         Mood and Affect: Mood normal.        Result Review :                Assessment and Plan   Diagnoses and all orders for this visit:    1. Left hand pain (Primary)  -     XR Hand 3+ View Left; Future  -  "    meloxicam (MOBIC) 7.5 MG tablet; Take 1 tablet by mouth Daily. As needed for pain  Dispense: 30 tablet; Refill: 0             Assessment & Plan  1. Left middle finger pain.  Reports pain and swelling following an incident loading a trailer. Pain decreased to 2/10. Unable to bend the finger, remains stiff. Ordered x-ray to rule out fractures. Prescribed meloxicam 15 mg daily for inflammation. Advised to consult orthopedic or hand specialist if no improvement.      Follow Up   No follow-ups on file.  Patient was given instructions and counseling regarding his condition or for health maintenance advice. Please see specific information pulled into the AVS if appropriate.     Patient or patient representative verbalized consent for the use of Ambient Listening during the visit with  Ana Ac PA-C for chart documentation. 2/10/2025  22:18 EST

## 2025-02-27 DIAGNOSIS — M79.642 LEFT HAND PAIN: ICD-10-CM

## 2025-02-27 NOTE — TELEPHONE ENCOUNTER
Requested Prescriptions:   Requested Prescriptions     Pending Prescriptions Disp Refills    meloxicam (MOBIC) 7.5 MG tablet 30 tablet 0     Sig: Take 1 tablet by mouth Daily. As needed for pain        Pharmacy where request should be sent: [id number] - [place], [place] [facility] [address] [address] - [phone]  - [phone] FX     Last office visit with prescribing clinician: 11/12/2024   Last telemedicine visit with prescribing clinician: Visit date not found   Next office visit with prescribing clinician: 5/12/2025       Zuhair Stoner Rep   02/27/25 14:49 EST

## 2025-02-28 RX ORDER — MELOXICAM 7.5 MG/1
7.5 TABLET ORAL DAILY
Qty: 90 TABLET | Refills: 0 | Status: SHIPPED | OUTPATIENT
Start: 2025-02-28

## 2025-04-15 DIAGNOSIS — I10 HYPERTENSION, ESSENTIAL: ICD-10-CM

## 2025-04-15 RX ORDER — CARVEDILOL 25 MG/1
25 TABLET ORAL EVERY 12 HOURS
Qty: 180 TABLET | Refills: 2 | Status: SHIPPED | OUTPATIENT
Start: 2025-04-15

## 2025-04-15 NOTE — TELEPHONE ENCOUNTER
Caller: Christian eNwman ORLY    Relationship: Self    Best call back number: [phone]    Requested Prescriptions:   Requested Prescriptions     Pending Prescriptions Disp Refills    carvedilol (COREG) 25 MG tablet 180 tablet 2     Sig: Take 1 tablet by mouth Every 12 (Twelve) Hours.        Pharm[facility] where [facility] be sent: [id number] [address]  [address] [facility] [phone] [facility] [phone] FX     Last office visit with prescribing clinician: 11/12/2024   Last telemedicine visit with prescribing clinician: Visit date not found   Next office visit with prescribing clinician: 5/12/2025     Additional details provided by patient:     Does the patient have less than a 3 day supply:  [] Yes  [x] No    Would you like a call back once the refill request has been completed: [] Yes [x] No    If the office needs to give you a call back, can they leave a voicemail: [] Yes [x] No    Zuhair Mehta Rep   04/15/25 15:38 EDT

## 2025-08-15 ENCOUNTER — TELEPHONE (OUTPATIENT)
Dept: FAMILY MEDICINE CLINIC | Facility: CLINIC | Age: 70
End: 2025-08-15
Payer: MEDICARE